# Patient Record
Sex: MALE | Race: BLACK OR AFRICAN AMERICAN | NOT HISPANIC OR LATINO | Employment: UNEMPLOYED | URBAN - METROPOLITAN AREA
[De-identification: names, ages, dates, MRNs, and addresses within clinical notes are randomized per-mention and may not be internally consistent; named-entity substitution may affect disease eponyms.]

---

## 2018-11-06 ENCOUNTER — HOSPITAL ENCOUNTER (EMERGENCY)
Facility: OTHER | Age: 53
Discharge: HOME | End: 2018-11-06
Attending: EMERGENCY MEDICINE
Payer: MEDICAID

## 2018-11-06 VITALS
HEIGHT: 66 IN | SYSTOLIC BLOOD PRESSURE: 146 MMHG | OXYGEN SATURATION: 95 % | DIASTOLIC BLOOD PRESSURE: 76 MMHG | HEART RATE: 100 BPM | TEMPERATURE: 98 F | RESPIRATION RATE: 16 BRPM | WEIGHT: 180 LBS | BODY MASS INDEX: 28.93 KG/M2

## 2018-11-06 DIAGNOSIS — M54.31 SCIATICA OF RIGHT SIDE: Primary | ICD-10-CM

## 2018-11-06 PROCEDURE — 99284 EMERGENCY DEPT VISIT MOD MDM: CPT | Mod: 25

## 2018-11-06 PROCEDURE — 96372 THER/PROPH/DIAG INJ SC/IM: CPT

## 2018-11-06 PROCEDURE — 63600175 PHARM REV CODE 636 W HCPCS: Performed by: EMERGENCY MEDICINE

## 2018-11-06 PROCEDURE — 25000003 PHARM REV CODE 250: Performed by: EMERGENCY MEDICINE

## 2018-11-06 RX ORDER — KETOROLAC TROMETHAMINE 30 MG/ML
10 INJECTION, SOLUTION INTRAMUSCULAR; INTRAVENOUS
Status: COMPLETED | OUTPATIENT
Start: 2018-11-06 | End: 2018-11-06

## 2018-11-06 RX ORDER — CYCLOBENZAPRINE HCL 10 MG
10 TABLET ORAL
Status: COMPLETED | OUTPATIENT
Start: 2018-11-06 | End: 2018-11-06

## 2018-11-06 RX ADMIN — KETOROLAC TROMETHAMINE 10 MG: 30 INJECTION, SOLUTION INTRAMUSCULAR at 11:11

## 2018-11-06 RX ADMIN — CYCLOBENZAPRINE HYDROCHLORIDE 10 MG: 10 TABLET, FILM COATED ORAL at 11:11

## 2018-11-07 NOTE — ED TRIAGE NOTES
"Pt arrived to Ed with c/o lower left leg pain with onset last night. Pt states"i am in so much pain from my Cheek bone all the way down my left side. It started last night, I haven't taken anything for it. I cant sit straight, I cant walk. I lifted something and it started hurting after." Pt sitting in bed, appears to be in pain, pt reports pain 10/10, respirations even, unlabored, eyes open spontaneously, NAD noted. PT denies numbness or tingling.  "

## 2018-11-07 NOTE — ED PROVIDER NOTES
Encounter Date: 11/6/2018    SCRIBE #1 NOTE: Jairo HENDERSON, am scribing for, and in the presence of, Dr. Torres.       History     Chief Complaint   Patient presents with    Sciatica     pain to the L buttock radiating down the L leg since last night     Seen by provider: 11:08 PM    Patient is a 53 y.o. male who presents to the ED with complaint of left lower back pain, which began last night. Patient reports onset of pain occurred after heavy lifting. Pain radiates from the left lower back to the left buttocks and down the back of the left leg. Pain has been constant since onset and is worse with movement, sitting upright, and walking. He reports no alleviating factors. He denies fever, dysuria, numbness, weakness, tingling, bowel incontinence, urinary incontinence, or urinary retention. He reports a history of simlar back pain on the right side in the past. He has no additional complaints at this time.      The history is provided by the patient.     Review of patient's allergies indicates:  No Known Allergies  Past Medical History:   Diagnosis Date    Bipolar disorder     Seizures      Past Surgical History:   Procedure Laterality Date    BRAIN SURGERY       History reviewed. No pertinent family history.  Social History     Tobacco Use    Smoking status: Current Every Day Smoker    Smokeless tobacco: Never Used   Substance Use Topics    Alcohol use: No    Drug use: No     Review of Systems   Constitutional: Negative for fever.   HENT: Negative for sore throat.    Respiratory: Negative for shortness of breath.    Cardiovascular: Negative for chest pain.   Gastrointestinal: Negative for nausea.        Negative for bowel incontinence.   Genitourinary: Negative for difficulty urinating and dysuria.        Negative for bladder incontinence.   Musculoskeletal: Positive for back pain and gait problem (secondary to back pain). Negative for neck pain.   Skin: Negative for rash.   Allergic/Immunologic:  Negative for immunocompromised state.   Neurological: Negative for weakness and numbness.   Psychiatric/Behavioral: Negative for confusion.       Physical Exam     Initial Vitals [11/06/18 2300]   BP Pulse Resp Temp SpO2   (!) 146/76 100 16 98.2 °F (36.8 °C) 95 %      MAP       --         Physical Exam    Nursing note and vitals reviewed.  Constitutional: He appears well-developed and well-nourished. He is not diaphoretic. No distress.   HENT:   Head: Normocephalic and atraumatic.   Eyes: Conjunctivae and EOM are normal.   Neck: Neck supple.   Cardiovascular: Normal rate, regular rhythm and normal heart sounds. Exam reveals no gallop and no friction rub.    No murmur heard.  Pulmonary/Chest: Breath sounds normal. No respiratory distress. He has no wheezes. He has no rhonchi. He has no rales.   Abdominal: Soft. There is no tenderness.   Musculoskeletal: Normal range of motion. He exhibits tenderness.   No midline spinal tenderness, step-offs or deformities of the cervical, thoracic or lumbar spine. Right sided lumbar paraspinal tenderness tracking into the buttocks.   Neurological: He is alert and oriented to person, place, and time. He has normal strength. No sensory deficit.   5/5 bilateral lower extremity strength with flexion and extension tested at the hips, knees, ankles, and great toes. Normal sensation bilateral lower extremities. Positive straight leg raise test on the right at 5 degrees. Negative straight leg test on the left.    Skin: Skin is warm and dry.   Psychiatric: He has a normal mood and affect. His behavior is normal. Thought content normal.         ED Course   Procedures  Labs Reviewed - No data to display       Imaging Results    None          Medical Decision Making:   ED Management:  Well-appearing patient with back pain that radiates down his leg after heavy lifting.  No trauma. No indication for imaging.  No neurologic symptoms. No weakness no loss of control of bladder or bowel.  Unlikely  spinal cord syndrome.  He has had sciatica on the other leg in the past.  Treated with ketorolac and Flexeril here.  Prescribed Flexeril.  Encouraged close follow up with primary care, have counseled the back pain may take a while to resolve.  I have counseled against bedrest.  I have counseled for stretching and early mobility.  Counseled to avoid heavy lifting.    I did have an extensive talk regarding signs to return for and need for follow up. Patient expressed understanding and will monitor symptoms closely and follow-up as needed.    LUCIANO Torres M.D.  11/07/2018  2:54 AM              Scribe Attestation:   Scribe #1: I performed the above scribed service and the documentation accurately describes the services I performed. I attest to the accuracy of the note.    Attending Attestation:           Physician Attestation for Scribe:  Physician Attestation Statement for Scribe #1: I, Dr. Torres, reviewed documentation, as scribed by Jairo Brown in my presence, and it is both accurate and complete.                    Clinical Impression:     1. Sciatica of right side                                Hugh Torres MD  11/07/18 0254